# Patient Record
Sex: FEMALE | Race: WHITE | NOT HISPANIC OR LATINO | Employment: FULL TIME | URBAN - METROPOLITAN AREA
[De-identification: names, ages, dates, MRNs, and addresses within clinical notes are randomized per-mention and may not be internally consistent; named-entity substitution may affect disease eponyms.]

---

## 2018-09-20 ENCOUNTER — CLINICAL SUPPORT (OUTPATIENT)
Dept: RETAIL CLINIC | Facility: CLINIC | Age: 18
End: 2018-09-20

## 2018-09-20 DIAGNOSIS — Z02.83 ENCOUNTER FOR DRUG SCREENING: Primary | ICD-10-CM

## 2018-09-20 NOTE — PROGRESS NOTES
Subjective   Aziza Pope is a 18 y.o. female.     Reason for Appointment  1. escreen    History of Present Illness  HPI:   See scanned document. See custody control form.    Assessments  1. Encounter for screening, unspecified - Z13.9       September 20, 2018 11:13 AM